# Patient Record
Sex: MALE | Race: BLACK OR AFRICAN AMERICAN | NOT HISPANIC OR LATINO | URBAN - METROPOLITAN AREA
[De-identification: names, ages, dates, MRNs, and addresses within clinical notes are randomized per-mention and may not be internally consistent; named-entity substitution may affect disease eponyms.]

---

## 2023-09-01 ENCOUNTER — HOSPITAL ENCOUNTER (OUTPATIENT)
Dept: DATA CONVERSION | Facility: HOSPITAL | Age: 43
End: 2023-09-01
Attending: PODIATRIST | Admitting: PODIATRIST

## 2023-09-01 DIAGNOSIS — M20.11 HALLUX VALGUS (ACQUIRED), RIGHT FOOT: ICD-10-CM

## 2023-09-01 DIAGNOSIS — M20.21 HALLUX RIGIDUS, RIGHT FOOT: ICD-10-CM

## 2023-09-01 LAB
ANION GAP IN SER/PLAS: 8 MMOL/L (ref 10–20)
CALCIUM (MG/DL) IN SER/PLAS: 8.5 MG/DL (ref 8.6–10.3)
CARBON DIOXIDE, TOTAL (MMOL/L) IN SER/PLAS: 27 MMOL/L (ref 21–32)
CHLORIDE (MMOL/L) IN SER/PLAS: 107 MMOL/L (ref 98–107)
CREATININE (MG/DL) IN SER/PLAS: 0.81 MG/DL (ref 0.5–1.3)
ERYTHROCYTE DISTRIBUTION WIDTH (RATIO) BY AUTOMATED COUNT: 12.2 % (ref 11.5–14.5)
ERYTHROCYTE DISTRIBUTION WIDTH (RATIO) BY AUTOMATED COUNT: NORMAL
ERYTHROCYTE MEAN CORPUSCULAR HEMOGLOBIN CONCENTRATION (G/DL) BY AUTOMATED: 30.7 G/DL (ref 32–36)
ERYTHROCYTE MEAN CORPUSCULAR HEMOGLOBIN CONCENTRATION (G/DL) BY AUTOMATED: NORMAL
ERYTHROCYTE MEAN CORPUSCULAR VOLUME (FL) BY AUTOMATED COUNT: 110 FL (ref 80–100)
ERYTHROCYTE MEAN CORPUSCULAR VOLUME (FL) BY AUTOMATED COUNT: NORMAL
ERYTHROCYTES (10*6/UL) IN BLOOD BY AUTOMATED COUNT: 4.09 X10E12/L (ref 4.5–5.9)
ERYTHROCYTES (10*6/UL) IN BLOOD BY AUTOMATED COUNT: NORMAL
GFR MALE: >90 ML/MIN/1.73M2
GLUCOSE (MG/DL) IN SER/PLAS: 103 MG/DL (ref 74–99)
HEMATOCRIT (%) IN BLOOD BY AUTOMATED COUNT: 44.9 % (ref 41–52)
HEMATOCRIT (%) IN BLOOD BY AUTOMATED COUNT: NORMAL
HEMOGLOBIN (G/DL) IN BLOOD: 13.8 G/DL (ref 13.5–17.5)
HEMOGLOBIN (G/DL) IN BLOOD: NORMAL
LEUKOCYTES (10*3/UL) IN BLOOD BY AUTOMATED COUNT: 6.4 X10E9/L (ref 4.4–11.3)
LEUKOCYTES (10*3/UL) IN BLOOD BY AUTOMATED COUNT: NORMAL
NRBC (PER 100 WBCS) BY AUTOMATED COUNT: 0 /100 WBC (ref 0–0)
NRBC (PER 100 WBCS) BY AUTOMATED COUNT: NORMAL
PLATELETS (10*3/UL) IN BLOOD AUTOMATED COUNT: 140 X10E9/L (ref 150–450)
PLATELETS (10*3/UL) IN BLOOD AUTOMATED COUNT: NORMAL
POTASSIUM (MMOL/L) IN SER/PLAS: 4.9 MMOL/L (ref 3.5–5.3)
SODIUM (MMOL/L) IN SER/PLAS: 137 MMOL/L (ref 136–145)
UREA NITROGEN (MG/DL) IN SER/PLAS: 17 MG/DL (ref 6–23)

## 2023-09-29 VITALS — WEIGHT: 188.05 LBS | HEIGHT: 64 IN | BODY MASS INDEX: 32.11 KG/M2

## 2023-09-30 NOTE — H&P
History & Physical Reviewed:   I have reviewed the History and Physical dated:  01-Sep-2023   History and Physical reviewed and relevant findings noted. Patient examined to review pertinent physical  findings.: No significant changes   Home Medications Reviewed: no changes noted   Allergies Reviewed: no changes noted       ERAS (Enhanced Recovery After Surgery):  ·  ERAS Patient: no     Consent:   COVID-19 Consent:  ·  COVID-19 Risk Consent Surgeon has reviewed key risks related to the risk of nelson COVID-19 and if they contract COVID-19 what the risks are.     Attestation:   Note Completion:        Electronic Signatures:  Sweta Mckeon (DPM (Resident))  (Signed 01-Sep-2023 07:03)   Authored: History & Physical Reviewed, ERAS, Consent,  Note Completion  Ehredt, Duane (NURY)  (Signed 01-Sep-2023 07:18)   Authored: Note Completion   Co-Signer: History & Physical Reviewed, ERAS, Consent, Note Completion      Last Updated: 01-Sep-2023 07:18 by Ehredt, Duane (NURY)

## 2023-10-01 NOTE — OP NOTE
PROCEDURE DETAILS    Preoperative Diagnosis:  Acquired hallux rigidus, M20.20  Hallux valgus (acquired), unspecified foot, M20.10    Postoperative Diagnosis:  Acquired hallux rigidus, M20.20  Hallux valgus (acquired), unspecified foot, M20.10    Surgeon: Ehredt, Duane  Resident/Fellow/Other Assistant: Sweta Mckeon    Procedure:  1. RIGHT FOOT CHEILCETOMY/ RIGHT GREAT AKIN OSTEOTOMY WITH MINI C-ARM    Anesthesia: General LMA with/ 20cc 0.5% buivicaine plain  Estimated Blood Loss: 0  Findings: Consistent with diagnosis.  Dorsal/lateral osseous prominence of the 1st MPJ right foot.  HAV deformity at the level of the phalanx right foot.  Specimens(s) Collected: no,     Complications: none  Implants: 2.5mm Arthrex headless compression screw  Tourniquet Times: 60 minutes right thigh 275mmHg.  Released prior to wound closure.  Patient Returned To/Condition: PACU stable condition        Operative Report:   Indications: Randi is a pleasant 42-year-old male known to my practice at the [facility].  Suffering with a right hallux abductovalgus  deformity with the apex of deformity at the level of the proximal phalanx of the great toe.  Also noted to have very mild hallux rigidus of the right great toe.  Has failed multiple conservative therapies and elected for surgical intervention.  Underwent  full surgical consultation as an outpatient well with the risks including possible loss of limb or life elected proceed with the case without any outside influence.    Procedure:  The operating room placed operatively supine position right lower extremity scrubbed prepped draped you sterile fashion prophylactic antibiotics  were administered.  Directed attention was then directed to the right first MP joint where a made I made a small stab incision approximately 2 cm proximal to the joint at the midline medially.  I then performed a minimally invasive technique to perform  a cheilectomy following the  published Arthrex technique.  I elevated the soft tissues dorsally and plantarly utilized a 4.5 mm conical bur that was low-speed Hi-Torque.  I performed a percutaneous dorsal cheilectomy of the first MP joint burring down  the osseous prominence that was previously palpated.  This was then removed down to a level of cortical bone was noted to improve the first MP joint range of motion.  Multiple radiographic images confirmed this.  I flushed out the small wound that was  close amounts normal sterile saline and closed the small incision with 3-0 Prolene in simple operative fashion.  Then directed my attention to the proximal phalanx where under radiographic graphic control identified the apex of deformity.  There was approximately  16 degrees of interdigital valgus noted at the IP joint.  Under radiographic control I performed a Evens osteotomy utilizing a low speed Hi-Torque bit.  Lateral cortex was kept intact and I then was able to close down the osteotomy in a valgus maneuver  held this in position with a temporary Grupo wire and fixated the osteotomy using an Arthrex 2.5 mm headless compression screw.  The wire was placed perpendicular to the osteotomy.  In the screw was measured drilled and inserted utilized recommend  manufactures technique.  Multiple radiographic images confirmed osteotomy apposition deformity reduction and hardware position.  The small wounds were then flushed with copious amounts normal sterile saline and closed with 3-0 Prolene in a simple operative  fashion.  The foot was then cleansed and patted dry bacitracin ointment Xeroform dry sterile dressing Coban for edema management placed right lower extremity.  He tolerated procedure and anesthesia well and apparent satisfactory condition was associated  PACU vital signs stable and vas status intact all 5 digits of the right foot.  He is monitored prior to discharge.  Note Recipients:   Ehredt, Duane, DPM - [phone]  [preferred]                          Electronic Signatures:  Ehredt, Duane (SAJI)  (Signed 01-Sep-2023 13:40)   Authored: Post-Operative Note, Chart Review, Note Completion      Last Updated: 01-Sep-2023 13:40 by Ehredt, Duane (SAJI)

## 2024-12-30 ENCOUNTER — HOSPITAL ENCOUNTER (EMERGENCY)
Facility: HOSPITAL | Age: 44
Discharge: LEFT BEFORE DISCHARGE | End: 2024-12-30
Payer: COMMERCIAL

## 2024-12-30 PROCEDURE — 4500999001 HC ED NO CHARGE

## 2024-12-30 PROCEDURE — 99283 EMERGENCY DEPT VISIT LOW MDM: CPT

## 2024-12-31 NOTE — ED NOTES
Attempted to call patient in [facility] for triage multiple times with no answer. Called number on file, spoke with patient. Patient stated he was good and had left. Patient was told he is welcome to return if he decides he wants to be seen. Patient agreed.      Tracy Sutton RN  12/30/24 [id number]